# Patient Record
Sex: FEMALE | Race: WHITE | NOT HISPANIC OR LATINO | Employment: OTHER | URBAN - METROPOLITAN AREA
[De-identification: names, ages, dates, MRNs, and addresses within clinical notes are randomized per-mention and may not be internally consistent; named-entity substitution may affect disease eponyms.]

---

## 2017-11-28 NOTE — PATIENT DISCUSSION
ERM could be contributing to some vision blur but again do not have any records to compare ocular history.  Refraction showed vision is correctable to 20/50.  Before considering any ERM surgery or cataract surgery do rec pt have refraction first to see if vision can be correctable to pt satisfaction.

## 2017-11-28 NOTE — PATIENT DISCUSSION
Can NOT tell pt if ERM was present before cat. surgery or not, this is the first time examining pt and again have no records to compare macula prior to cataract surgery.

## 2017-11-28 NOTE — PATIENT DISCUSSION
Pt did not bring any records from any eye Dr. or retina Dr. he has seen for comparison or vision prior to cataract surgery.

## 2017-11-28 NOTE — PATIENT DISCUSSION
The patient's findings are compatible with epiretinal membrane with macular pucker. Macular pucker is usually due to previous posterior vitreous detachment but can also be due to uveitis, retinal vascular occlusion, retinal tear, retinal detachment, and idiopathic. Most patients with epiretinal membranes with macular pucker do not progress to the point where intervention is needed. Intervention is typically surgical. The patient can be observed carefully with retinal follow-up in a number of months. If the maculopathy progresses, surgery will be considered.  75% of ERM stay same.

## 2017-12-04 NOTE — PATIENT DISCUSSION
Discussed at length with pt that ERM is an aging change and can happen with or without cataract surgery.

## 2017-12-04 NOTE — PATIENT DISCUSSION
=/- glasses.  Ck prior records and discuss with Dr Nino Chen Then call patient and =/- Dr Catarino Duane.

## 2017-12-04 NOTE — PATIENT DISCUSSION
See #1 and #4.  Pt to go back to original cataract surgeon to eval options if not happy with new glasses.  ERM not causing VA to be 20/200, looks more like a 20/50 membrane.  ERM could have been there prior to surgery OR could have happened after when PVD occurred.

## 2017-12-04 NOTE — PATIENT DISCUSSION
Recommend basic lens when ready for cataract surgery.  See #1, pt will try new glasses first due to ERM OD.  Pt to return to original cataract surgeon to eval options.

## 2017-12-15 NOTE — PATIENT DISCUSSION
Based on today’s exam, diagnostic studies, and review of records, and the patients functional difficulty which appear to be a result of the macular pucker, the determination was made for a vitrectomy with membrane peel. Discussed benefits, alternatives, and risks of surgery including (but not limited to) cataract (if natural lens is still present), infection, bleeding, retinal detachment, optic neuropathy, loss of vision, blindness, and loss of eye. Patient was told the vision may not return to the same level as prior to development of the membrane but should improve as the anatomy returns to a more normal contour. No prediction of the level of visual improvement can be given. Discussed possibility of air or gas bubble.

## 2017-12-15 NOTE — PATIENT DISCUSSION
=/- glasses.  Ck prior records and discuss with Dr Castrejon Austin Then call patient and =/- Dr Vanessa Reyes.

## 2018-01-04 NOTE — PATIENT DISCUSSION
=/- glasses.  Ck prior records and discuss with Dr Eugene Muñiz Then call patient and =/- Dr Hua Reis.

## 2018-01-05 NOTE — PATIENT DISCUSSION
=/- glasses.  Ck prior records and discuss with Dr Vivi Heredia Then call patient and =/- Dr Jacky Diop.

## 2018-01-11 NOTE — PATIENT DISCUSSION
=/- glasses.  Ck prior records and discuss with Dr Castrejon [place] Then call patient and =/- Dr Vanessa Reyes.

## 2018-02-05 NOTE — PATIENT DISCUSSION
Edu patient that vision improves with pin hole which may indicate need for either glasses or possible change in lens. Lens could be too strong after ERM surgery. Possibly require IOL Exchange. However stressed to patient vision is DEFINITELY improved. Recc Auto refraction today. Vision improves with refraction of 0.75 x 1.50 x 110. Possible Rx glasses vs Laser vs IOL Exchange discussed. Stressed retina is not suited for a multi focal lens at this point, especially after surgery.

## 2018-02-21 NOTE — PATIENT DISCUSSION
=/- glasses.  Ck prior records and discuss with Dr Koehler Persons Then call patient and =/- Dr Fernando You.

## 2018-02-21 NOTE — PATIENT DISCUSSION
Dr. Andres Sandoval does not recommend IOL exchange OD due to length of time since surgery and compromised retina increasing the risk of tear or detachment. Dr. Andres Sandoval recommends conservative approach and recommends glasses to improve vision or second opinion.

## 2018-02-21 NOTE — PATIENT DISCUSSION
Yag may improve vision but advised patient to hold off on yag laser until he has decided whether he wants a second opinion on what to do with OD.

## 2018-02-21 NOTE — PATIENT DISCUSSION
Dr. Carolina Mensah does not recommend IOL exchange OD due to length of time since surgery and compromised retina increasing the risk of tear or detachment. Dr. Carolina Mensah recommends conservative approach and recommends glasses to improve vision or second opinion.

## 2018-02-26 NOTE — PATIENT DISCUSSION
CV IOL OS- Goal Laly.  Patient aware visual outcome limited due to retina but the CV will get him to his best potential and decrease his dependence on glasses the most.  Patient accepts and wishes to proceed.

## 2018-02-26 NOTE — PATIENT DISCUSSION
Dr. Laurie Coyne does not recommend IOL exchange OD due to length of time since surgery and compromised retina increasing the risk of tear or detachment. Dr. Laurie Coyne recommends conservative approach and recommends glasses to improve vision or second opinion.

## 2018-02-26 NOTE — PATIENT DISCUSSION
Dr. Vickie Gomez does not recommend IOL exchange OD due to length of time since surgery and compromised retina increasing the risk of tear or detachment. Dr. Vickie Gomez recommends conservative approach and recommends glasses to improve vision or second opinion.

## 2018-03-01 NOTE — PATIENT DISCUSSION
The patient feels that the cataract is significantly impacting daily activities and has elected cataract surgery. The risks, benefits, and alternatives to surgery were discussed. The patient elects to proceed with surgery. PATIENT ELECTS PCIOL OS BASIC GOAL ROLAN.

## 2018-03-01 NOTE — PROCEDURE NOTE: SURGICAL
<p>Prior to commencing surgery patient identification, surgical procedure, site, and side were confirmed by Dr. Haley Reis. Following topical proparacaine anesthesia, the patient was positioned at the YAG laser, a contact lens coupled to the cornea with methylcellulose and an axial posterior capsulotomy performed without complication using 2.9 Mj x 18. Excess methylcellulose was washed from the eye, one drop of Alphagan was instilled and the patient returned to the holding area having tolerated the procedure well and without complication. </p><p>MRN [id number]</p>

## 2018-03-01 NOTE — PATIENT DISCUSSION
Dr. Ishmael Denson does not recommend IOL exchange OD due to length of time since surgery and compromised retina increasing the risk of tear or detachment. Dr. Ishmael Denson recommends conservative approach and recommends glasses to improve vision or second opinion.

## 2018-03-01 NOTE — PATIENT DISCUSSION
=/- glasses.  Ck prior records and discuss with Dr Toby Eagle Then call patient and =/- Dr Maye Forbes.

## 2018-03-01 NOTE — PATIENT DISCUSSION
Dr. Sruthi Barnes does not recommend IOL exchange OD due to length of time since surgery and compromised retina increasing the risk of tear or detachment. Dr. Sruthi Barnes recommends conservative approach and recommends glasses to improve vision or second opinion.

## 2018-03-15 NOTE — PATIENT DISCUSSION
Dr. Tyler Huber does not recommend IOL exchange OD due to length of time since surgery and compromised retina increasing the risk of tear or detachment. Dr. Tyler Huber recommends conservative approach and recommends glasses to improve vision or second opinion.

## 2018-03-16 NOTE — PATIENT DISCUSSION
=/- glasses.  Ck prior records and discuss with Dr Wise Less Then call patient and =/- Dr Ricky Sims.

## 2018-03-16 NOTE — PATIENT DISCUSSION
Dr. Arnulfo Kramer does not recommend IOL exchange OD due to length of time since surgery and compromised retina increasing the risk of tear or detachment. Dr. Arnulfo Kramer recommends conservative approach and recommends glasses to improve vision or second opinion.

## 2018-03-16 NOTE — PATIENT DISCUSSION
Elevated intraocular pressure treated with wound burp.  Drop of Vigamox instilled in office.  IOP 10.

## 2018-03-23 NOTE — PATIENT DISCUSSION
=/- glasses.  Ck prior records and discuss with Dr Mark Ruiz Then call patient and =/- Dr Carol Isbell.

## 2018-03-23 NOTE — PATIENT DISCUSSION
Dr. Bethel Toro does not recommend IOL exchange OD due to length of time since surgery and compromised retina increasing the risk of tear or detachment. Dr. Bethel Toro recommends conservative approach and recommends glasses to improve vision or second opinion.

## 2018-04-04 NOTE — PATIENT DISCUSSION
=/- glasses.  Ck prior records and discuss with Dr Leti Abdi Then call patient and =/- Dr [place].

## 2018-04-04 NOTE — PATIENT DISCUSSION
Dr. Gómez Hyde does not recommend IOL exchange OD due to length of time since surgery and compromised retina increasing the risk of tear or detachment. Dr. Gómez Hyde recommends conservative approach and recommends glasses to improve vision or second opinion.

## 2018-06-05 ENCOUNTER — ESTABLISHED COMPREHENSIVE EXAM (OUTPATIENT)
Dept: URBAN - METROPOLITAN AREA CLINIC 35 | Facility: CLINIC | Age: 73
End: 2018-06-05

## 2018-06-05 DIAGNOSIS — H16.103: ICD-10-CM

## 2018-06-05 DIAGNOSIS — H02.203: ICD-10-CM

## 2018-06-05 DIAGNOSIS — H25.813: ICD-10-CM

## 2018-06-05 DIAGNOSIS — H02.206: ICD-10-CM

## 2018-06-05 DIAGNOSIS — H43.813: ICD-10-CM

## 2018-06-05 DIAGNOSIS — H02.052: ICD-10-CM

## 2018-06-05 PROCEDURE — 92014 COMPRE OPH EXAM EST PT 1/>: CPT

## 2018-06-05 PROCEDURE — 92134 CPTRZ OPH DX IMG PST SGM RTA: CPT

## 2018-06-05 PROCEDURE — 92015 DETERMINE REFRACTIVE STATE: CPT

## 2018-06-05 ASSESSMENT — VISUAL ACUITY
OD_BAT: 20/60
OD_SC: 20/60+2
OS_SC: 20/30+2
OS_BAT: 20/200
OS_SC: J6
OD_CC: 20/30-1
OD_CC: J2
OD_SC: J8
OS_CC: J2
OS_CC: 20/30

## 2018-06-05 ASSESSMENT — TONOMETRY
OD_IOP_MMHG: 17
OS_IOP_MMHG: 17

## 2018-11-08 NOTE — PROCEDURE NOTE: SURGICAL
<p>Prior to commencing surgery patient identification, surgical procedure, site, and side were confirmed by Dr. Haley Reis. Following topical proparacaine anesthesia, the patient was positioned at the YAG laser, a contact lens coupled to the cornea with methylcellulose and an axial posterior capsulotomy performed without complication using 3.0 Mj x 16. Excess methylcellulose was washed from the eye, one drop of Alphagan was instilled and the patient returned to the holding area having tolerated the procedure well and without complication. </p><p>MRN [id number]</p>

## 2018-12-11 ENCOUNTER — CATARACT CONSULT (OUTPATIENT)
Dept: URBAN - METROPOLITAN AREA CLINIC 39 | Facility: CLINIC | Age: 73
End: 2018-12-11

## 2018-12-11 VITALS
SYSTOLIC BLOOD PRESSURE: 128 MMHG | DIASTOLIC BLOOD PRESSURE: 70 MMHG | RESPIRATION RATE: 14 BRPM | HEIGHT: 55 IN | HEART RATE: 68 BPM

## 2018-12-11 DIAGNOSIS — H02.203: ICD-10-CM

## 2018-12-11 DIAGNOSIS — H25.812: ICD-10-CM

## 2018-12-11 DIAGNOSIS — H43.813: ICD-10-CM

## 2018-12-11 DIAGNOSIS — H02.206: ICD-10-CM

## 2018-12-11 DIAGNOSIS — H02.052: ICD-10-CM

## 2018-12-11 DIAGNOSIS — H04.123: ICD-10-CM

## 2018-12-11 DIAGNOSIS — H25.811: ICD-10-CM

## 2018-12-11 PROCEDURE — V2799I IMPRIMIS

## 2018-12-11 PROCEDURE — 67820 REVISE EYELASHES: CPT

## 2018-12-11 PROCEDURE — 92134 CPTRZ OPH DX IMG PST SGM RTA: CPT

## 2018-12-11 PROCEDURE — 92025-2 CORNEAL TOPOGRAPHY, PT

## 2018-12-11 PROCEDURE — 92136TC INTERFEROMETRY - TECHNICAL COMPONENT

## 2018-12-11 PROCEDURE — 92014 COMPRE OPH EXAM EST PT 1/>: CPT

## 2018-12-11 PROCEDURE — 92015 DETERMINE REFRACTIVE STATE: CPT

## 2018-12-11 ASSESSMENT — VISUAL ACUITY
OS_CC: 20/30-1
OD_CC: J2
OS_CC: J1
OD_SC: J12
OS_GLARE: 20/80
OD_SC: 20/40
OS_SC: J12
OS_SC: 20/30
OD_CC: 20/30-2
OD_GLARE: 20/100

## 2018-12-11 ASSESSMENT — TONOMETRY
OD_IOP_MMHG: 17
OS_IOP_MMHG: 16

## 2019-01-17 ENCOUNTER — PRE-OP/H&P (OUTPATIENT)
Dept: URBAN - METROPOLITAN AREA CLINIC 39 | Facility: CLINIC | Age: 74
End: 2019-01-17

## 2019-01-17 ENCOUNTER — SURGERY/PROCEDURE (OUTPATIENT)
Dept: URBAN - METROPOLITAN AREA CLINIC 39 | Facility: CLINIC | Age: 74
End: 2019-01-17

## 2019-01-17 VITALS
SYSTOLIC BLOOD PRESSURE: 128 MMHG | RESPIRATION RATE: 14 BRPM | HEART RATE: 68 BPM | DIASTOLIC BLOOD PRESSURE: 70 MMHG | HEIGHT: 55 IN

## 2019-01-17 DIAGNOSIS — H25.812: ICD-10-CM

## 2019-01-17 PROCEDURE — G8428 CUR MEDS NOT DOCUMENT: HCPCS

## 2019-01-17 PROCEDURE — 99211T TECH SERVICE

## 2019-01-17 PROCEDURE — G8482 FLU IMMUNIZE ORDER/ADMIN: HCPCS

## 2019-01-17 PROCEDURE — 66984CV REMOVE CATARACT, INSERT LENS, CUSTOM VISION

## 2019-01-17 PROCEDURE — 66999LNSR LENSAR LASER FOR CAT SX

## 2019-01-17 PROCEDURE — G8952 PRE-HTN/HTN, NO F/U, NOT GVN: HCPCS

## 2019-01-17 PROCEDURE — G8418 CALC BMI BLW LOW PARAM F/U: HCPCS

## 2019-01-17 PROCEDURE — 4040F PNEUMOC VAC/ADMIN/RCVD: CPT

## 2019-01-18 ENCOUNTER — CATARACT POST-OP 1-DAY (OUTPATIENT)
Dept: URBAN - METROPOLITAN AREA CLINIC 35 | Facility: CLINIC | Age: 74
End: 2019-01-18

## 2019-01-18 DIAGNOSIS — H40.052: ICD-10-CM

## 2019-01-18 DIAGNOSIS — Z96.1: ICD-10-CM

## 2019-01-18 PROCEDURE — 99024 POSTOP FOLLOW-UP VISIT: CPT

## 2019-01-18 RX ORDER — BRIMONIDINE TARTRATE, TIMOLOL MALEATE 2; 5 MG/ML; MG/ML: 1 SOLUTION/ DROPS OPHTHALMIC TWICE A DAY

## 2019-01-18 ASSESSMENT — TONOMETRY
OS_IOP_MMHG: 25
OS_IOP_MMHG: 27
OD_IOP_MMHG: 13

## 2019-01-18 ASSESSMENT — VISUAL ACUITY
OS_PH: 20/40
OS_SC: 20/60-1
OD_SC: 20/40-2

## 2019-01-22 ENCOUNTER — POST OP/EVAL OF SECOND EYE (OUTPATIENT)
Dept: URBAN - METROPOLITAN AREA CLINIC 35 | Facility: CLINIC | Age: 74
End: 2019-01-22

## 2019-01-22 DIAGNOSIS — H25.811: ICD-10-CM

## 2019-01-22 DIAGNOSIS — Z96.1: ICD-10-CM

## 2019-01-22 PROCEDURE — 99024 POSTOP FOLLOW-UP VISIT: CPT

## 2019-01-22 PROCEDURE — 99213 OFFICE O/P EST LOW 20 MIN: CPT

## 2019-01-22 ASSESSMENT — TONOMETRY
OS_IOP_MMHG: 16
OD_IOP_MMHG: 17

## 2019-01-22 ASSESSMENT — VISUAL ACUITY
OD_PH: 20/30
OD_SC: 20/40-1
OS_SC: 20/50

## 2019-01-24 ENCOUNTER — PRE-OP/H&P (OUTPATIENT)
Dept: URBAN - METROPOLITAN AREA CLINIC 39 | Facility: CLINIC | Age: 74
End: 2019-01-24

## 2019-01-24 ENCOUNTER — SURGERY/PROCEDURE (OUTPATIENT)
Dept: URBAN - METROPOLITAN AREA CLINIC 39 | Facility: CLINIC | Age: 74
End: 2019-01-24

## 2019-01-24 VITALS
HEART RATE: 57 BPM | DIASTOLIC BLOOD PRESSURE: 60 MMHG | SYSTOLIC BLOOD PRESSURE: 126 MMHG | HEIGHT: 55 IN | RESPIRATION RATE: 12 BRPM

## 2019-01-24 DIAGNOSIS — H25.811: ICD-10-CM

## 2019-01-24 DIAGNOSIS — H40.052: ICD-10-CM

## 2019-01-24 PROCEDURE — G8482 FLU IMMUNIZE ORDER/ADMIN: HCPCS

## 2019-01-24 PROCEDURE — G8418 CALC BMI BLW LOW PARAM F/U: HCPCS

## 2019-01-24 PROCEDURE — G8428 CUR MEDS NOT DOCUMENT: HCPCS

## 2019-01-24 PROCEDURE — 66999LNSR LENSAR LASER FOR CAT SX

## 2019-01-24 PROCEDURE — 99211T TECH SERVICE

## 2019-01-24 PROCEDURE — 66984CV REMOVE CATARACT, INSERT LENS, CUSTOM VISION

## 2019-01-24 PROCEDURE — G8952 PRE-HTN/HTN, NO F/U, NOT GVN: HCPCS

## 2019-01-24 PROCEDURE — 4040F PNEUMOC VAC/ADMIN/RCVD: CPT

## 2019-01-24 PROCEDURE — 65772LRI LRI DURING CAT SX

## 2019-01-25 ENCOUNTER — CATARACT POST-OP 1-DAY (OUTPATIENT)
Dept: URBAN - METROPOLITAN AREA CLINIC 35 | Facility: CLINIC | Age: 74
End: 2019-01-25

## 2019-01-25 DIAGNOSIS — H40.051: ICD-10-CM

## 2019-01-25 DIAGNOSIS — Z96.1: ICD-10-CM

## 2019-01-25 PROCEDURE — 99024 POSTOP FOLLOW-UP VISIT: CPT

## 2019-01-25 ASSESSMENT — VISUAL ACUITY
OS_SC: J4
OD_SC: 20/20
OD_SC: J12
OS_SC: 20/40

## 2019-01-25 ASSESSMENT — TONOMETRY
OD_IOP_MMHG: 23
OS_IOP_MMHG: 13
OD_IOP_MMHG: 25

## 2019-01-31 ENCOUNTER — POST-OP CATARACT (OUTPATIENT)
Dept: URBAN - METROPOLITAN AREA CLINIC 35 | Facility: CLINIC | Age: 74
End: 2019-01-31

## 2019-01-31 DIAGNOSIS — Z96.1: ICD-10-CM

## 2019-01-31 PROCEDURE — 99024 POSTOP FOLLOW-UP VISIT: CPT

## 2019-01-31 ASSESSMENT — VISUAL ACUITY
OD_SC: 20/20
OS_SC: J3
OD_SC: J5-
OS_SC: 20/30-1

## 2019-01-31 ASSESSMENT — TONOMETRY
OD_IOP_MMHG: 19
OS_IOP_MMHG: 18

## 2019-02-05 ENCOUNTER — POST-OP CATARACT (OUTPATIENT)
Dept: URBAN - METROPOLITAN AREA CLINIC 35 | Facility: CLINIC | Age: 74
End: 2019-02-05

## 2019-02-05 DIAGNOSIS — H04.123: ICD-10-CM

## 2019-02-05 DIAGNOSIS — H02.206: ICD-10-CM

## 2019-02-05 DIAGNOSIS — Z96.1: ICD-10-CM

## 2019-02-05 DIAGNOSIS — H02.203: ICD-10-CM

## 2019-02-05 PROCEDURE — 68761S PUNCTUM PLUG / SILICONE,EACH

## 2019-02-05 PROCEDURE — 99213 OFFICE O/P EST LOW 20 MIN: CPT

## 2019-02-05 PROCEDURE — 99024 POSTOP FOLLOW-UP VISIT: CPT

## 2019-02-05 PROCEDURE — A4263 PERMANENT TEAR DUCT PLUG: HCPCS

## 2019-02-05 ASSESSMENT — TONOMETRY
OS_IOP_MMHG: 16
OD_IOP_MMHG: 14

## 2019-02-05 ASSESSMENT — VISUAL ACUITY
OS_SC: 20/30 W/ STRAIN
OS_SC: J4
OD_SC: 20/20
OD_SC: J4

## 2019-03-05 ENCOUNTER — POST-OP CATARACT (OUTPATIENT)
Dept: URBAN - METROPOLITAN AREA CLINIC 35 | Facility: CLINIC | Age: 74
End: 2019-03-05

## 2019-03-05 DIAGNOSIS — Z96.1: ICD-10-CM

## 2019-03-05 PROCEDURE — 99024 POSTOP FOLLOW-UP VISIT: CPT

## 2019-03-05 ASSESSMENT — VISUAL ACUITY
OD_SC: 20/20-1
OD_SC: J6
OS_SC: J4
OS_SC: 20/30-1

## 2019-03-05 ASSESSMENT — TONOMETRY
OD_IOP_MMHG: 17
OS_IOP_MMHG: 18

## 2019-04-18 ENCOUNTER — SURGERY/PROCEDURE (OUTPATIENT)
Dept: URBAN - METROPOLITAN AREA SURGERY 14 | Facility: SURGERY | Age: 74
End: 2019-04-18

## 2019-04-18 ENCOUNTER — ESTABLISHED COMPREHENSIVE EXAM (OUTPATIENT)
Dept: URBAN - METROPOLITAN AREA CLINIC 39 | Facility: CLINIC | Age: 74
End: 2019-04-18

## 2019-04-18 DIAGNOSIS — H26.493: ICD-10-CM

## 2019-04-18 PROCEDURE — 92014 COMPRE OPH EXAM EST PT 1/>: CPT

## 2019-04-18 PROCEDURE — 6682150 YAG CAPSULOTOMY

## 2019-04-18 ASSESSMENT — VISUAL ACUITY
OS_BAT: 20/70
OS_SC: 20/30+2
OD_SC: 20/20
OS_CC: J1
OD_BAT: 20/80
OD_CC: J1

## 2019-04-18 ASSESSMENT — TONOMETRY
OD_IOP_MMHG: 14
OS_IOP_MMHG: 14

## 2019-04-24 ENCOUNTER — YAG POST-OP (OUTPATIENT)
Dept: URBAN - METROPOLITAN AREA CLINIC 35 | Facility: CLINIC | Age: 74
End: 2019-04-24

## 2019-04-24 DIAGNOSIS — H40.051: ICD-10-CM

## 2019-04-24 DIAGNOSIS — Z98.890: ICD-10-CM

## 2019-04-24 PROCEDURE — 99024 POSTOP FOLLOW-UP VISIT: CPT

## 2019-04-24 ASSESSMENT — VISUAL ACUITY
OS_SC: 20/40
OD_SC: 20/20

## 2019-04-24 ASSESSMENT — TONOMETRY
OD_IOP_MMHG: 13
OS_IOP_MMHG: 13

## 2019-06-19 ENCOUNTER — FOLLOW UP (OUTPATIENT)
Dept: URBAN - METROPOLITAN AREA CLINIC 39 | Facility: CLINIC | Age: 74
End: 2019-06-19

## 2019-06-19 VITALS
HEART RATE: 54 BPM | SYSTOLIC BLOOD PRESSURE: 132 MMHG | DIASTOLIC BLOOD PRESSURE: 75 MMHG | RESPIRATION RATE: 14 BRPM | HEIGHT: 55 IN

## 2019-06-19 DIAGNOSIS — H40.051: ICD-10-CM

## 2019-06-19 DIAGNOSIS — Z98.890: ICD-10-CM

## 2019-06-19 DIAGNOSIS — H52.7: ICD-10-CM

## 2019-06-19 DIAGNOSIS — Z96.1: ICD-10-CM

## 2019-06-19 PROCEDURE — 99024 POSTOP FOLLOW-UP VISIT: CPT

## 2019-06-19 PROCEDURE — 92025NC COMP. CORNEAL TOPO, UNI OR BILAT,

## 2019-06-19 RX ORDER — PREDNISOLONE ACETATE 10 MG/ML: 1 SUSPENSION/ DROPS OPHTHALMIC

## 2019-06-19 RX ORDER — MOXIFLOXACIN HYDROCHLORIDE 5 MG/ML: 1 SOLUTION/ DROPS OPHTHALMIC

## 2019-06-19 ASSESSMENT — TONOMETRY
OD_IOP_MMHG: 12
OS_IOP_MMHG: 10

## 2019-06-19 ASSESSMENT — VISUAL ACUITY
OS_SC: 20/40
OD_SC: 20/20-1
OD_SC: J12
OS_SC: J8

## 2019-07-18 ENCOUNTER — SURGERY/PROCEDURE (OUTPATIENT)
Dept: URBAN - METROPOLITAN AREA CLINIC 39 | Facility: CLINIC | Age: 74
End: 2019-07-18

## 2019-07-18 DIAGNOSIS — H52.7: ICD-10-CM

## 2019-07-18 PROCEDURE — 66999ISPP INTRALASE LASIK S/P ADVANCED / CUSTOM VISION < 12 MONTHS

## 2019-07-24 ENCOUNTER — LASIK POST OP (OUTPATIENT)
Dept: URBAN - METROPOLITAN AREA CLINIC 39 | Facility: CLINIC | Age: 74
End: 2019-07-24

## 2019-07-24 ENCOUNTER — ESTABLISHED PATIENT (OUTPATIENT)
Dept: URBAN - METROPOLITAN AREA CLINIC 35 | Facility: CLINIC | Age: 74
End: 2019-07-24

## 2019-07-24 DIAGNOSIS — Z98.890: ICD-10-CM

## 2019-07-24 DIAGNOSIS — D49.2: ICD-10-CM

## 2019-07-24 DIAGNOSIS — H40.051: ICD-10-CM

## 2019-07-24 PROCEDURE — 67840 REMOVE EYELID LESION: CPT

## 2019-07-24 PROCEDURE — 99024 POSTOP FOLLOW-UP VISIT: CPT

## 2019-07-24 PROCEDURE — 99213 OFFICE O/P EST LOW 20 MIN: CPT

## 2019-07-24 ASSESSMENT — VISUAL ACUITY
OU_SC: 20/20-2
OD_SC: 20/20-1
OS_SC: 20/30-1
OS_SC: 20/30
OD_SC: 20/20

## 2019-08-05 NOTE — PATIENT DISCUSSION
Addended by: FABBY ZELAYA on: 8/5/2019 01:28 PM     Modules accepted: Orders     Patient elects to proceed with YAG capsulotomy.

## 2019-09-04 ENCOUNTER — LASIK POST OP (OUTPATIENT)
Dept: URBAN - METROPOLITAN AREA CLINIC 35 | Facility: CLINIC | Age: 74
End: 2019-09-04

## 2019-09-04 DIAGNOSIS — Z98.890: ICD-10-CM

## 2019-09-04 PROCEDURE — 99024 POSTOP FOLLOW-UP VISIT: CPT

## 2019-09-04 ASSESSMENT — VISUAL ACUITY
OS_SC: 20/20-1
OD_SC: 20/20

## 2019-12-04 NOTE — PATIENT DISCUSSION
----- Message from Arcelia Walton M.D. sent at 12/3/2019  4:33 PM PST -----  Labs show iron deficiency anemia.  Recommend oral iron replacement.  Rx is being sent in.  She needs follow up with us to help determine why she is anemic and has low iron.   Resolved. See # 1.

## 2019-12-27 ENCOUNTER — EST. PATIENT EMERGENCY (OUTPATIENT)
Dept: URBAN - METROPOLITAN AREA CLINIC 38 | Facility: CLINIC | Age: 74
End: 2019-12-27

## 2019-12-27 DIAGNOSIS — H40.051: ICD-10-CM

## 2019-12-27 DIAGNOSIS — H04.123: ICD-10-CM

## 2019-12-27 PROCEDURE — 92012 INTRM OPH EXAM EST PATIENT: CPT

## 2019-12-27 PROCEDURE — A4262 TEMPORARY TEAR DUCT PLUG: HCPCS

## 2019-12-27 PROCEDURE — 68761Q PUNCTAL PLUG/QUINTESS DISSOLVABLE PLUG/EACH

## 2019-12-27 ASSESSMENT — VISUAL ACUITY
OD_SC: 20/20-1
OS_SC: J12
OS_SC: 20/20
OD_SC: J12

## 2019-12-27 ASSESSMENT — TONOMETRY
OS_IOP_MMHG: 18
OD_IOP_MMHG: 16

## 2020-06-03 ENCOUNTER — FOLLOW UP (OUTPATIENT)
Dept: URBAN - METROPOLITAN AREA CLINIC 35 | Facility: CLINIC | Age: 75
End: 2020-06-03

## 2020-06-03 DIAGNOSIS — H02.052: ICD-10-CM

## 2020-06-03 DIAGNOSIS — H52.7: ICD-10-CM

## 2020-06-03 DIAGNOSIS — H02.206: ICD-10-CM

## 2020-06-03 DIAGNOSIS — H02.203: ICD-10-CM

## 2020-06-03 DIAGNOSIS — Z98.890: ICD-10-CM

## 2020-06-03 DIAGNOSIS — H43.813: ICD-10-CM

## 2020-06-03 DIAGNOSIS — H04.123: ICD-10-CM

## 2020-06-03 PROCEDURE — 92014 COMPRE OPH EXAM EST PT 1/>: CPT

## 2020-06-03 PROCEDURE — 67820 REVISE EYELASHES: CPT

## 2020-06-03 PROCEDURE — 92015 DETERMINE REFRACTIVE STATE: CPT

## 2020-06-03 ASSESSMENT — VISUAL ACUITY
OD_CC: J1
OS_CC: J1
OS_SC: 20/20-1
OD_SC: 20/20-1
OU_SC: 20/20
OU_CC: J1

## 2020-06-03 ASSESSMENT — TONOMETRY
OD_IOP_MMHG: 17
OS_IOP_MMHG: 17

## 2020-10-07 ENCOUNTER — EST. PATIENT EMERGENCY (OUTPATIENT)
Dept: URBAN - METROPOLITAN AREA CLINIC 35 | Facility: CLINIC | Age: 75
End: 2020-10-07

## 2020-10-07 DIAGNOSIS — H04.123: ICD-10-CM

## 2020-10-07 DIAGNOSIS — H02.203: ICD-10-CM

## 2020-10-07 DIAGNOSIS — H16.142: ICD-10-CM

## 2020-10-07 DIAGNOSIS — H02.206: ICD-10-CM

## 2020-10-07 PROCEDURE — 92012 INTRM OPH EXAM EST PATIENT: CPT

## 2020-10-07 ASSESSMENT — VISUAL ACUITY
OD_SC: 20/20
OS_SC: 20/20

## 2021-09-09 NOTE — PATIENT DISCUSSION
Anesthesia ROS/Med Hx    Overall Review:  EKG was reviewed     Anesthetic Complication History:  Patient does not have a history of anesthetic complications      Pulmonary Review:  Positive for pneumonia  The patient is a former smoker.  (Quit 2002)    Neuro/Psych Review:    Positive for psychiatric history - Anxiety    Cardiovascular Review:    Positive for hypertension    GI/HEPATIC/RENAL Review:   Positive for liver disease (Fatty infiltration of liver)     End/Other Review:    Positive for cancer (Vulva cancer )      Physical Exam     Airway   Mallampati: II  TM Distance: >3 FB    Cardiovascular  Cardiovascular exam normal  Cardio Rhythm: Regular  Cardio Rate: Normal    Head Assessment  Head assessment: Normocephalic    General Assessment  General Assessment: Alert and oriented and No acute distress    Dental Exam    Patient has:  Upper dentures and Lower dentures  Dental Note:   Full upper, partial lower    Pulmonary Exam  Pulmonary exam normal  Breath sounds clear to auscultation:  Yes    Abdominal Exam  Abdominal exam normal      Anesthesia Plan:  Anesthesia Plan    ASA Status: 2    Anesthesia Type: MAC    Induction: Intravenous  Premedication: None      Post-op Pain Management: Per Surgeon  Postoperative analgesia plan does NOT include opiods    Checklist  Reviewed: Lab Results, EKG, Chest X-Rays, Care Everywhere, DNR Status, NPO Status, Problem list, Medications, Allergies and Past Med History  Consent/Risks Discussed Statement:  The proposed anesthetic plan, including its risks and benefits, have been discussed with the Patient along with the risks and benefits of alternatives. Questions were encouraged and answered and the patient and/or representative understands and agrees to proceed.    I have discussed elements of the patient's history or examination, as noted above and/or as follows, that place the patient at higher risk of complications; age and liver disease.    I discussed with the patient (and/or  See #1 and #4.  Pt to go back to original cataract surgeon to eval options if not happy with new glasses.  ERM not causing VA to be 20/200, looks more like a 20/50 membrane.  ERM could have been there prior to surgery OR could have happened after when PVD occurred. patient's legal representative) the risks and benefits of the proposed anesthesia plan, MAC, which may include services performed by other anesthesia providers.    Alternative anesthesia plans, if available, were reviewed with the patient (and/or patient's legal representative). Discussion has been held with the patient (and/or patient's legal representative) regarding risks of anesthesia, which include emergent situations that may require change in anesthesia plan.    The patient (and/or patient's legal representative) has indicated understanding, his/her questions have been answered, and he/she wishes to proceed with the planned anesthetic.      Blood Products: Not Anticipated

## 2023-08-10 NOTE — PATIENT DISCUSSION
Discussed at length with pt that ERM is an aging change and can happen with or without cataract surgery. Home Suture Removal Text: Patient was provided instructions on removing sutures and will remove their sutures at home.  If they have any questions or difficulties they will call the office.

## 2024-04-10 ENCOUNTER — EMERGENCY VISIT (OUTPATIENT)
Dept: URBAN - METROPOLITAN AREA CLINIC 35 | Facility: CLINIC | Age: 79
End: 2024-04-10

## 2024-04-10 DIAGNOSIS — H16.142: ICD-10-CM

## 2024-04-10 DIAGNOSIS — H02.052: ICD-10-CM

## 2024-04-10 DIAGNOSIS — H52.7: ICD-10-CM

## 2024-04-10 DIAGNOSIS — H43.813: ICD-10-CM

## 2024-04-10 DIAGNOSIS — H04.123: ICD-10-CM

## 2024-04-10 DIAGNOSIS — H02.203: ICD-10-CM

## 2024-04-10 DIAGNOSIS — H02.206: ICD-10-CM

## 2024-04-10 PROCEDURE — 92012 INTRM OPH EXAM EST PATIENT: CPT

## 2024-04-10 RX ORDER — LOTEPREDNOL ETABONATE 3.8 MG/G: 1 GEL OPHTHALMIC

## 2024-04-10 ASSESSMENT — VISUAL ACUITY
OS_SC: 20/25-2
OU_SC: 20/20-1
OD_SC: 20/20

## 2024-05-21 ENCOUNTER — FOLLOW UP (OUTPATIENT)
Dept: URBAN - METROPOLITAN AREA CLINIC 35 | Facility: CLINIC | Age: 79
End: 2024-05-21

## 2024-05-21 DIAGNOSIS — Z98.890: ICD-10-CM

## 2024-05-21 DIAGNOSIS — H02.052: ICD-10-CM

## 2024-05-21 DIAGNOSIS — H16.142: ICD-10-CM

## 2024-05-21 DIAGNOSIS — H02.203: ICD-10-CM

## 2024-05-21 DIAGNOSIS — H43.813: ICD-10-CM

## 2024-05-21 DIAGNOSIS — H04.123: ICD-10-CM

## 2024-05-21 DIAGNOSIS — H02.206: ICD-10-CM

## 2024-05-21 DIAGNOSIS — H55.00: ICD-10-CM

## 2024-05-21 PROCEDURE — 92012 INTRM OPH EXAM EST PATIENT: CPT

## 2024-05-21 ASSESSMENT — VISUAL ACUITY
OS_SC: 20/30-1
OD_SC: 20/25-2
OU_SC: 20/25

## 2024-07-31 ENCOUNTER — EMERGENCY VISIT (OUTPATIENT)
Dept: URBAN - METROPOLITAN AREA CLINIC 35 | Facility: CLINIC | Age: 79
End: 2024-07-31

## 2024-07-31 DIAGNOSIS — R51.9: ICD-10-CM

## 2024-07-31 DIAGNOSIS — H04.123: ICD-10-CM

## 2024-07-31 DIAGNOSIS — H43.813: ICD-10-CM

## 2024-07-31 DIAGNOSIS — H35.363: ICD-10-CM

## 2024-07-31 DIAGNOSIS — H02.206: ICD-10-CM

## 2024-07-31 DIAGNOSIS — H43.391: ICD-10-CM

## 2024-07-31 DIAGNOSIS — H02.203: ICD-10-CM

## 2024-07-31 PROCEDURE — 99213 OFFICE O/P EST LOW 20 MIN: CPT

## 2024-07-31 PROCEDURE — 92134 CPTRZ OPH DX IMG PST SGM RTA: CPT

## 2024-07-31 ASSESSMENT — VISUAL ACUITY
OD_SC: J3
OU_SC: J1-
OS_SC: 20/30+
OU_SC: 20/20-1
OD_SC: 20/25
OS_SC: J3

## 2024-07-31 ASSESSMENT — TONOMETRY
OD_IOP_MMHG: 17
OS_IOP_MMHG: 17